# Patient Record
Sex: FEMALE | Race: WHITE | NOT HISPANIC OR LATINO | Employment: FULL TIME | URBAN - NONMETROPOLITAN AREA
[De-identification: names, ages, dates, MRNs, and addresses within clinical notes are randomized per-mention and may not be internally consistent; named-entity substitution may affect disease eponyms.]

---

## 2017-02-02 ENCOUNTER — CLINICAL SUPPORT (OUTPATIENT)
Dept: FAMILY MEDICINE CLINIC | Facility: CLINIC | Age: 32
End: 2017-02-02

## 2017-02-02 DIAGNOSIS — Z30.42 ENCOUNTER FOR SURVEILLANCE OF INJECTABLE CONTRACEPTIVE: Primary | ICD-10-CM

## 2017-02-02 PROCEDURE — 96372 THER/PROPH/DIAG INJ SC/IM: CPT | Performed by: NURSE PRACTITIONER

## 2017-02-02 RX ORDER — MEDROXYPROGESTERONE ACETATE 150 MG/ML
150 INJECTION, SUSPENSION INTRAMUSCULAR ONCE
Status: COMPLETED | OUTPATIENT
Start: 2017-02-02 | End: 2017-02-02

## 2017-02-02 RX ADMIN — MEDROXYPROGESTERONE ACETATE 150 MG: 150 INJECTION, SUSPENSION INTRAMUSCULAR at 15:55

## 2017-05-09 ENCOUNTER — OFFICE VISIT (OUTPATIENT)
Dept: FAMILY MEDICINE CLINIC | Facility: CLINIC | Age: 32
End: 2017-05-09

## 2017-05-09 VITALS
RESPIRATION RATE: 17 BRPM | HEART RATE: 73 BPM | OXYGEN SATURATION: 98 % | SYSTOLIC BLOOD PRESSURE: 122 MMHG | TEMPERATURE: 98.1 F | DIASTOLIC BLOOD PRESSURE: 80 MMHG | BODY MASS INDEX: 28.95 KG/M2 | HEIGHT: 68 IN | WEIGHT: 191 LBS

## 2017-05-09 DIAGNOSIS — Z76.3: Primary | ICD-10-CM

## 2017-05-09 DIAGNOSIS — Z71.89: Primary | ICD-10-CM

## 2017-05-10 ENCOUNTER — OFFICE VISIT (OUTPATIENT)
Dept: FAMILY MEDICINE CLINIC | Facility: CLINIC | Age: 32
End: 2017-05-10

## 2017-05-10 VITALS
HEART RATE: 85 BPM | DIASTOLIC BLOOD PRESSURE: 78 MMHG | WEIGHT: 191 LBS | SYSTOLIC BLOOD PRESSURE: 116 MMHG | HEIGHT: 68 IN | RESPIRATION RATE: 16 BRPM | OXYGEN SATURATION: 99 % | TEMPERATURE: 98.7 F | BODY MASS INDEX: 28.95 KG/M2

## 2017-05-10 DIAGNOSIS — Z71.89: Primary | ICD-10-CM

## 2017-05-10 DIAGNOSIS — Z76.3: Primary | ICD-10-CM

## 2017-05-10 PROCEDURE — 99212 OFFICE O/P EST SF 10 MIN: CPT | Performed by: NURSE PRACTITIONER

## 2017-05-22 ENCOUNTER — CLINICAL SUPPORT (OUTPATIENT)
Dept: FAMILY MEDICINE CLINIC | Facility: CLINIC | Age: 32
End: 2017-05-22

## 2017-05-22 ENCOUNTER — LAB (OUTPATIENT)
Dept: LAB | Facility: OTHER | Age: 32
End: 2017-05-22

## 2017-05-22 DIAGNOSIS — Z30.42 ENCOUNTER FOR SURVEILLANCE OF INJECTABLE CONTRACEPTIVE: ICD-10-CM

## 2017-05-22 DIAGNOSIS — Z30.42 ENCOUNTER FOR SURVEILLANCE OF INJECTABLE CONTRACEPTIVE: Primary | ICD-10-CM

## 2017-05-22 DIAGNOSIS — Z30.42 ENCOUNTER FOR DEPO-PROVERA CONTRACEPTION: Primary | ICD-10-CM

## 2017-05-22 LAB — B-HCG UR QL: NEGATIVE

## 2017-05-22 PROCEDURE — 81025 URINE PREGNANCY TEST: CPT | Performed by: NURSE PRACTITIONER

## 2017-05-22 PROCEDURE — 96372 THER/PROPH/DIAG INJ SC/IM: CPT | Performed by: NURSE PRACTITIONER

## 2017-05-22 RX ORDER — MEDROXYPROGESTERONE ACETATE 150 MG/ML
150 INJECTION, SUSPENSION INTRAMUSCULAR
Status: SHIPPED | OUTPATIENT
Start: 2017-05-22 | End: 2018-08-15

## 2017-05-22 RX ADMIN — MEDROXYPROGESTERONE ACETATE 150 MG: 150 INJECTION, SUSPENSION INTRAMUSCULAR at 16:01

## 2017-08-08 ENCOUNTER — CLINICAL SUPPORT (OUTPATIENT)
Dept: FAMILY MEDICINE CLINIC | Facility: CLINIC | Age: 32
End: 2017-08-08

## 2017-08-08 DIAGNOSIS — Z30.42 ENCOUNTER FOR DEPO-PROVERA CONTRACEPTION: Primary | ICD-10-CM

## 2017-08-08 PROCEDURE — 96372 THER/PROPH/DIAG INJ SC/IM: CPT | Performed by: NURSE PRACTITIONER

## 2017-08-08 RX ADMIN — MEDROXYPROGESTERONE ACETATE 150 MG: 150 INJECTION, SUSPENSION INTRAMUSCULAR at 11:00

## 2017-10-06 ENCOUNTER — OFFICE VISIT (OUTPATIENT)
Dept: FAMILY MEDICINE CLINIC | Facility: CLINIC | Age: 32
End: 2017-10-06

## 2017-10-06 VITALS
TEMPERATURE: 98.2 F | OXYGEN SATURATION: 99 % | HEART RATE: 79 BPM | HEIGHT: 68 IN | SYSTOLIC BLOOD PRESSURE: 124 MMHG | DIASTOLIC BLOOD PRESSURE: 68 MMHG | WEIGHT: 193 LBS | BODY MASS INDEX: 29.25 KG/M2

## 2017-10-06 DIAGNOSIS — Z00.00 ENCOUNTER FOR GENERAL ADULT MEDICAL EXAMINATION WITHOUT ABNORMAL FINDINGS: Primary | ICD-10-CM

## 2017-10-06 PROCEDURE — 99395 PREV VISIT EST AGE 18-39: CPT | Performed by: NURSE PRACTITIONER

## 2017-10-06 NOTE — PROGRESS NOTES
Arian Sandhu is a 32 y.o. female. Presents today for a wellness physical for her insurance.    History of Present Illness     The following portions of the patient's history were reviewed and updated as appropriate: allergies, current medications, past family history, past medical history, past social history, past surgical history and problem list.    Review of Systems   Constitutional: Negative for chills, fatigue and fever.   HENT: Negative for congestion, sneezing, sore throat and trouble swallowing.    Eyes: Negative for visual disturbance.   Respiratory: Negative for cough, chest tightness, shortness of breath and wheezing.    Cardiovascular: Negative for chest pain, palpitations and leg swelling.   Gastrointestinal: Negative for abdominal pain, constipation, diarrhea, nausea and vomiting.   Genitourinary: Negative for dysuria, frequency and urgency.   Musculoskeletal: Negative for neck pain.   Skin: Negative for rash.   Neurological: Negative for dizziness, weakness and headaches.   Psychiatric/Behavioral:        In the past two weeks the pt has not felt down, depressed, hopeless or lost interest in doing things   All other systems reviewed and are negative.      Objective   Physical Exam   Constitutional: She is oriented to person, place, and time. She appears well-developed and well-nourished. She is cooperative. She does not have a sickly appearance. She does not appear ill. No distress.   HENT:   Head: Normocephalic and atraumatic.   Right Ear: External ear normal.   Left Ear: External ear normal.   Nose: Nose normal.   Mouth/Throat: Uvula is midline, oropharynx is clear and moist and mucous membranes are normal.   Eyes: Conjunctivae, EOM and lids are normal. Pupils are equal, round, and reactive to light. Right eye exhibits no discharge. Left eye exhibits no discharge. No scleral icterus.   Neck: Trachea normal, normal range of motion and phonation normal. Neck supple. No thyromegaly  present.   Cardiovascular: Normal rate, regular rhythm, normal heart sounds and intact distal pulses.  Exam reveals no gallop and no friction rub.    No murmur heard.  Pulmonary/Chest: Effort normal and breath sounds normal. No respiratory distress. She has no wheezes. She has no rales.   Abdominal: Soft. Normal appearance and bowel sounds are normal. She exhibits no distension. There is no tenderness. There is no rebound and no guarding.   Musculoskeletal: Normal range of motion. She exhibits no edema.   Lymphadenopathy:     She has no cervical adenopathy.   Neurological: She is alert and oriented to person, place, and time. No cranial nerve deficit. GCS eye subscore is 4. GCS verbal subscore is 5. GCS motor subscore is 6.   Skin: Skin is warm, dry and intact. No rash noted.   Psychiatric: She has a normal mood and affect. Her behavior is normal. Judgment and thought content normal.   Nursing note and vitals reviewed.      Assessment/Plan   Alis was seen today for annual exam.    Diagnoses and all orders for this visit:    Encounter for general adult medical examination without abnormal findings  -     Lipid Panel; Future  -     Basic Metabolic Panel

## 2017-10-12 ENCOUNTER — LAB (OUTPATIENT)
Dept: LAB | Facility: OTHER | Age: 32
End: 2017-10-12

## 2017-10-12 DIAGNOSIS — Z00.00 ENCOUNTER FOR GENERAL ADULT MEDICAL EXAMINATION WITHOUT ABNORMAL FINDINGS: ICD-10-CM

## 2017-10-12 LAB
ANION GAP SERPL CALCULATED.3IONS-SCNC: 8 MMOL/L (ref 5–15)
BUN BLD-MCNC: 12 MG/DL (ref 8–25)
BUN/CREAT SERPL: 17.1 (ref 7–25)
CALCIUM SPEC-SCNC: 9.5 MG/DL (ref 8.4–10.8)
CHLORIDE SERPL-SCNC: 106 MMOL/L (ref 100–112)
CHOLEST SERPL-MCNC: 117 MG/DL (ref 150–200)
CO2 SERPL-SCNC: 27 MMOL/L (ref 20–32)
CREAT BLD-MCNC: 0.7 MG/DL (ref 0.4–1.3)
GFR SERPL CREATININE-BSD FRML MDRD: 98 ML/MIN/1.73 (ref 64–149)
GLUCOSE BLD-MCNC: 89 MG/DL (ref 70–100)
HDLC SERPL-MCNC: 49 MG/DL (ref 35–100)
LDLC SERPL CALC-MCNC: 58 MG/DL
LDLC/HDLC SERPL: 1.19 {RATIO}
POTASSIUM BLD-SCNC: 4 MMOL/L (ref 3.4–5.4)
SODIUM BLD-SCNC: 141 MMOL/L (ref 134–146)
TRIGL SERPL-MCNC: 48 MG/DL (ref 35–160)
VLDLC SERPL-MCNC: 9.6 MG/DL

## 2017-10-12 PROCEDURE — 36415 COLL VENOUS BLD VENIPUNCTURE: CPT | Performed by: NURSE PRACTITIONER

## 2017-10-12 PROCEDURE — 80061 LIPID PANEL: CPT | Performed by: NURSE PRACTITIONER

## 2017-10-12 PROCEDURE — 80048 BASIC METABOLIC PNL TOTAL CA: CPT | Performed by: NURSE PRACTITIONER

## 2017-10-13 ENCOUNTER — OFFICE VISIT (OUTPATIENT)
Dept: FAMILY MEDICINE CLINIC | Facility: CLINIC | Age: 32
End: 2017-10-13

## 2017-10-13 VITALS
RESPIRATION RATE: 16 BRPM | HEIGHT: 68 IN | HEART RATE: 81 BPM | DIASTOLIC BLOOD PRESSURE: 70 MMHG | SYSTOLIC BLOOD PRESSURE: 114 MMHG | TEMPERATURE: 99.9 F | BODY MASS INDEX: 29.55 KG/M2 | WEIGHT: 195 LBS | OXYGEN SATURATION: 99 %

## 2017-10-13 DIAGNOSIS — J40 BRONCHITIS: Primary | ICD-10-CM

## 2017-10-13 DIAGNOSIS — R05.9 COUGH: ICD-10-CM

## 2017-10-13 DIAGNOSIS — J02.9 ACUTE PHARYNGITIS, UNSPECIFIED ETIOLOGY: ICD-10-CM

## 2017-10-13 DIAGNOSIS — B37.9 ANTIBIOTIC-INDUCED YEAST INFECTION: ICD-10-CM

## 2017-10-13 DIAGNOSIS — T36.95XA ANTIBIOTIC-INDUCED YEAST INFECTION: ICD-10-CM

## 2017-10-13 DIAGNOSIS — H66.002 ACUTE SUPPURATIVE OTITIS MEDIA OF LEFT EAR WITHOUT SPONTANEOUS RUPTURE OF TYMPANIC MEMBRANE, RECURRENCE NOT SPECIFIED: ICD-10-CM

## 2017-10-13 PROCEDURE — 96372 THER/PROPH/DIAG INJ SC/IM: CPT | Performed by: NURSE PRACTITIONER

## 2017-10-13 PROCEDURE — 99213 OFFICE O/P EST LOW 20 MIN: CPT | Performed by: NURSE PRACTITIONER

## 2017-10-13 RX ORDER — AMOXICILLIN AND CLAVULANATE POTASSIUM 875; 125 MG/1; MG/1
1 TABLET, FILM COATED ORAL 2 TIMES DAILY
Qty: 20 TABLET | Refills: 0 | Status: SHIPPED | OUTPATIENT
Start: 2017-10-13 | End: 2018-06-26

## 2017-10-13 RX ORDER — DEXTROMETHORPHAN HYDROBROMIDE AND PROMETHAZINE HYDROCHLORIDE 15; 6.25 MG/5ML; MG/5ML
5 SYRUP ORAL NIGHTLY PRN
Qty: 118 ML | Refills: 0 | Status: SHIPPED | OUTPATIENT
Start: 2017-10-13 | End: 2018-06-26

## 2017-10-13 RX ORDER — FLUCONAZOLE 150 MG/1
TABLET ORAL
Qty: 2 TABLET | Refills: 0 | Status: SHIPPED | OUTPATIENT
Start: 2017-10-13 | End: 2018-06-26

## 2017-10-13 RX ORDER — BETAMETHASONE SODIUM PHOSPHATE AND BETAMETHASONE ACETATE 3; 3 MG/ML; MG/ML
6 INJECTION, SUSPENSION INTRA-ARTICULAR; INTRALESIONAL; INTRAMUSCULAR; SOFT TISSUE EVERY 24 HOURS
Status: DISCONTINUED | OUTPATIENT
Start: 2017-10-13 | End: 2017-10-13

## 2017-10-13 RX ORDER — BETAMETHASONE SODIUM PHOSPHATE AND BETAMETHASONE ACETATE 3; 3 MG/ML; MG/ML
6 INJECTION, SUSPENSION INTRA-ARTICULAR; INTRALESIONAL; INTRAMUSCULAR; SOFT TISSUE ONCE
Status: COMPLETED | OUTPATIENT
Start: 2017-10-13 | End: 2017-10-13

## 2017-10-13 RX ORDER — GUAIFENESIN 600 MG/1
1200 TABLET, EXTENDED RELEASE ORAL 2 TIMES DAILY
Qty: 40 TABLET | Refills: 0 | Status: SHIPPED | OUTPATIENT
Start: 2017-10-13 | End: 2018-06-26

## 2017-10-13 RX ORDER — ALBUTEROL SULFATE 90 UG/1
AEROSOL, METERED RESPIRATORY (INHALATION)
Qty: 1 INHALER | Refills: 1 | Status: SHIPPED | OUTPATIENT
Start: 2017-10-13 | End: 2018-06-26

## 2017-10-13 RX ADMIN — BETAMETHASONE SODIUM PHOSPHATE AND BETAMETHASONE ACETATE 6 MG: 3; 3 INJECTION, SUSPENSION INTRA-ARTICULAR; INTRALESIONAL; INTRAMUSCULAR; SOFT TISSUE at 16:52

## 2017-10-13 NOTE — PROGRESS NOTES
Arian   Alis Sandhu is a 32 y.o. female who presents to the office complaining of cough.  Cough   This is a new (x 1 week) problem. The current episode started in the past 7 days. The problem has been gradually worsening. The problem occurs constantly (started last wednesday with sore throat and has gradually gotten worse). The cough is productive of purulent sputum. Associated symptoms include chills, ear congestion, a fever, a sore throat and shortness of breath. Pertinent negatives include no chest pain, ear pain, headaches, heartburn, hemoptysis, myalgias, nasal congestion, postnasal drip, rash, rhinorrhea, sweats, weight loss or wheezing. Associated symptoms comments: Some chest tightness, mainly chest congestion, cough worse at nighttime, pain sever in throat with cough, no allergic like symptoms, no GI symptoms. Patient saw Alanna Morton Friday and she was told to take otc mucinex for congested cough. The symptoms are aggravated by cold air. Treatments tried: only taking mucinex. The treatment provided mild relief.      Been exposed to strep.     The following portions of the patient's history were reviewed and updated as appropriate: allergies, current medications, past family history, past medical history, past social history, past surgical history and problem list.    Review of Systems   Constitutional: Positive for chills, fatigue and fever. Negative for appetite change, diaphoresis and weight loss.   HENT: Positive for sore throat and trouble swallowing. Negative for congestion, ear pain, postnasal drip, rhinorrhea, sinus pain, sinus pressure and sneezing.    Eyes: Negative.    Respiratory: Positive for cough, chest tightness and shortness of breath. Negative for hemoptysis and wheezing.    Cardiovascular: Negative for chest pain, palpitations and leg swelling.   Gastrointestinal: Negative for abdominal pain, diarrhea, heartburn, nausea and vomiting.   Genitourinary: Negative.    Musculoskeletal:  "Negative for myalgias.   Skin: Negative for rash.   Neurological: Negative for dizziness, weakness, numbness and headaches.       Past Medical History:   Diagnosis Date   • Abnormal weight gain    • Acute frontal sinusitis    • Acute otitis media    • Acute pharyngitis    • Acute sinusitis    • Acute vaginitis    • Allergic rhinitis    • Bronchitis    • Cobalamin deficiency    • Contraception    • Contraception management    • Diarrhea    • Disorder of female genital organ    • Encounter for general adult medical examination without abnormal findings    • Encounter for gynecological examination    • Encounter for gynecological examination with abnormal finding    • Encounter for initial prescription of injectable contraceptive    • Encounter for surveillance of injectable contraceptive    • Generalized abdominal pain    • Nausea    • Obesity    • Other Obesity due to excess calories    • Pelvic and perineal pain    • Perforation of tympanic membrane    • Right upper quadrant pain    • Upper respiratory infection    • Vaginal discharge    • Vulvovaginitis        Family History   Problem Relation Age of Onset   • Diabetes Other    • Hypertension Other           Objective   /70 (BP Location: Right arm, Patient Position: Sitting, Cuff Size: Adult)  Pulse 81  Temp 99.9 °F (37.7 °C) (Oral)   Resp 16  Ht 68\" (172.7 cm)  Wt 195 lb (88.5 kg)  SpO2 99%  Breastfeeding? No  BMI 29.65 kg/m2  Physical Exam   Constitutional: She appears well-developed and well-nourished. She is cooperative.   HENT:   Head: Normocephalic.   Right Ear: Hearing, external ear and ear canal normal. No drainage. Tympanic membrane is injected and bulging. Tympanic membrane is not erythematous and not retracted. No decreased hearing is noted.   Left Ear: Hearing, external ear and ear canal normal. No drainage. Tympanic membrane is erythematous and bulging. Tympanic membrane is not injected and not retracted. No decreased hearing is noted. "   Nose: No mucosal edema, rhinorrhea or sinus tenderness. Right sinus exhibits no maxillary sinus tenderness and no frontal sinus tenderness. Left sinus exhibits no maxillary sinus tenderness and no frontal sinus tenderness.   Mouth/Throat: Uvula is midline and mucous membranes are normal. Oropharyngeal exudate, posterior oropharyngeal edema and posterior oropharyngeal erythema present. Tonsils are 2+ on the right. Tonsils are 2+ on the left.   Eyes: Conjunctivae are normal. Pupils are equal, round, and reactive to light.   Cardiovascular: Normal rate, regular rhythm, normal heart sounds and intact distal pulses.  Exam reveals no gallop and no friction rub.    No murmur heard.  Pulmonary/Chest: Effort normal. No respiratory distress. She has decreased breath sounds. She has wheezes in the right upper field, the left upper field and the left lower field. She has no rales.   Abdominal: Soft. Normal appearance and bowel sounds are normal. She exhibits no shifting dullness, no distension, no pulsatile liver, no fluid wave, no abdominal bruit, no ascites, no pulsatile midline mass and no mass. There is no hepatosplenomegaly. There is no tenderness. There is no rebound, no guarding, no CVA tenderness, no tenderness at McBurney's point and negative Richardson's sign. No hernia.   Lymphadenopathy:        Head (right side): Tonsillar adenopathy present. No submental, no submandibular, no preauricular, no posterior auricular and no occipital adenopathy present.        Head (left side): Tonsillar, preauricular and posterior auricular adenopathy present. No submental, no submandibular and no occipital adenopathy present.     She has cervical adenopathy.   Neurological: She is alert.   Psychiatric: She has a normal mood and affect. Her behavior is normal.   Nursing note and vitals reviewed.       PHQ-2/PHQ-9 Depression Screening 10/13/2017   Little interest or pleasure in doing things 0   Feeling down, depressed, or hopeless 0    Total Score 0         Assessment/Plan   Alis was seen today for cough.    Diagnoses and all orders for this visit:    Bronchitis  -     Discontinue: betamethasone acetate-betamethasone sodium phosphate (CELESTONE SOLUSPAN) injection 6 mg; Inject 1 mL into the shoulder, thigh, or buttocks Daily.  -     promethazine-dextromethorphan (PROMETHAZINE-DM) 6.25-15 MG/5ML syrup; Take 5 mL by mouth At Night As Needed (cough and nausea). Do not drive with this medication  -     albuterol (VENTOLIN HFA) 108 (90 Base) MCG/ACT inhaler; Take 2 puffs every 4 hrs while you're awake until the cough starts to get better than wean off.  -     betamethasone acetate-betamethasone sodium phosphate (CELESTONE SOLUSPAN) injection 6 mg; Inject 1 mL into the shoulder, thigh, or buttocks 1 (One) Time.    Acute suppurative otitis media of left ear without spontaneous rupture of tympanic membrane, recurrence not specified  -     amoxicillin-clavulanate (AUGMENTIN) 875-125 MG per tablet; Take 1 tablet by mouth 2 (Two) Times a Day.  -     guaiFENesin (MUCINEX) 600 MG 12 hr tablet; Take 2 tablets by mouth 2 (Two) Times a Day.    Acute pharyngitis, unspecified etiology    Antibiotic-induced yeast infection    Cough    Other orders  -     fluconazole (DIFLUCAN) 150 MG tablet; Take one 150 mg tab today and one tab in one week      Patient has acute bronchitis along with left ear infection and cough that keeps her up at night.  Prescribed albuterol, Celestone shot for bronchitis.  Augmentin for left ear infection and possible bacterial pharyngitis.  Gave Mucinex for congestive cough at prescription level.  Gave Phenergan DM for cough.  Gave diclofenac to prevent increased infection related to antibiotic intake.  Patient educated that if her cough, fever, or condition gets worse she should follow up with urgent care or the emergency room.  Patient educated to call me if she does not feel better and Monday.  Patient educated to drink plenty of  fluids and rest.  Patient in agreement and understands plan of care.     An After Visit Summary was printed and given to the patient.      Current Outpatient Prescriptions:   •  albuterol (VENTOLIN HFA) 108 (90 Base) MCG/ACT inhaler, Take 2 puffs every 4 hrs while you're awake until the cough starts to get better than wean off., Disp: 1 inhaler, Rfl: 1  •  amoxicillin-clavulanate (AUGMENTIN) 875-125 MG per tablet, Take 1 tablet by mouth 2 (Two) Times a Day., Disp: 20 tablet, Rfl: 0  •  fluconazole (DIFLUCAN) 150 MG tablet, Take one 150 mg tab today and one tab in one week, Disp: 2 tablet, Rfl: 0  •  guaiFENesin (MUCINEX) 600 MG 12 hr tablet, Take 2 tablets by mouth 2 (Two) Times a Day., Disp: 40 tablet, Rfl: 0  •  promethazine-dextromethorphan (PROMETHAZINE-DM) 6.25-15 MG/5ML syrup, Take 5 mL by mouth At Night As Needed (cough and nausea). Do not drive with this medication, Disp: 118 mL, Rfl: 0    Current Facility-Administered Medications:   •  MedroxyPROGESTERone Acetate (DEPO-PROVERA) injection 150 mg, 150 mg, Intramuscular, Q3 Months, MOHSEN Castrejon, 150 mg at 08/08/17 1100      MOHSEN Hays        This document has been electronically signed by MOHSEN Hays on October 13, 2017 5:07 PM

## 2017-10-19 DIAGNOSIS — J40 BRONCHITIS: Primary | ICD-10-CM

## 2017-10-19 RX ORDER — METHYLPREDNISOLONE 4 MG/1
TABLET ORAL
Qty: 1 EACH | Refills: 0 | Status: SHIPPED | OUTPATIENT
Start: 2017-10-19 | End: 2018-06-26

## 2017-11-08 ENCOUNTER — CLINICAL SUPPORT (OUTPATIENT)
Dept: FAMILY MEDICINE CLINIC | Facility: CLINIC | Age: 32
End: 2017-11-08

## 2017-11-08 DIAGNOSIS — Z30.42 ENCOUNTER FOR DEPO-PROVERA CONTRACEPTION: Primary | ICD-10-CM

## 2017-11-08 PROCEDURE — 96372 THER/PROPH/DIAG INJ SC/IM: CPT | Performed by: NURSE PRACTITIONER

## 2017-11-28 RX ADMIN — MEDROXYPROGESTERONE ACETATE 150 MG: 150 INJECTION, SUSPENSION INTRAMUSCULAR at 11:49

## 2018-06-26 ENCOUNTER — OFFICE VISIT (OUTPATIENT)
Dept: FAMILY MEDICINE CLINIC | Facility: CLINIC | Age: 33
End: 2018-06-26

## 2018-06-26 VITALS
HEART RATE: 76 BPM | SYSTOLIC BLOOD PRESSURE: 110 MMHG | HEIGHT: 68 IN | BODY MASS INDEX: 29.55 KG/M2 | DIASTOLIC BLOOD PRESSURE: 72 MMHG | WEIGHT: 195 LBS

## 2018-06-26 DIAGNOSIS — B07.8 OTHER VIRAL WARTS: Primary | ICD-10-CM

## 2018-06-26 PROCEDURE — 17110 DESTRUCTION B9 LES UP TO 14: CPT | Performed by: NURSE PRACTITIONER

## 2018-06-26 NOTE — PROGRESS NOTES
Subjective   Alis Sandhu is a 32 y.o. female. Patient here today with complaints of Verrucous Vulgaris  Patient here today with complaints of 3 warts on right hand middle finger distally, present times months, has been frozen previously but did not completely destroy warts.    Vitals:    06/26/18 1334   BP: 110/72   Pulse: 76     Past Medical History:   Diagnosis Date   • Abnormal weight gain    • Acute frontal sinusitis    • Acute otitis media    • Acute pharyngitis    • Acute sinusitis    • Acute vaginitis    • Allergic rhinitis    • Bronchitis    • Cobalamin deficiency    • Contraception    • Contraception management    • Diarrhea    • Disorder of female genital organ    • Encounter for general adult medical examination without abnormal findings    • Encounter for gynecological examination    • Encounter for gynecological examination with abnormal finding    • Encounter for initial prescription of injectable contraceptive    • Encounter for surveillance of injectable contraceptive    • Generalized abdominal pain    • Nausea    • Obesity    • Other Obesity due to excess calories    • Pelvic and perineal pain    • Perforation of tympanic membrane    • Right upper quadrant pain    • Upper respiratory infection    • Vaginal discharge    • Vulvovaginitis      History of Present Illness     The following portions of the patient's history were reviewed and updated as appropriate: allergies, current medications, past family history, past medical history, past social history, past surgical history and problem list.    Review of Systems   Constitutional: Negative.    HENT: Negative.    Eyes: Negative.    Respiratory: Negative.    Cardiovascular: Negative.    Gastrointestinal: Negative.    Endocrine: Negative.    Genitourinary: Negative.    Musculoskeletal: Negative.    Skin: Positive for wound (3 warts, right hand, middle finger).   Allergic/Immunologic: Negative.    Neurological: Negative.    Hematological: Negative.     Psychiatric/Behavioral: Negative.        Objective   Physical Exam   Constitutional: She is oriented to person, place, and time. She appears well-developed and well-nourished. No distress.   HENT:   Head: Normocephalic and atraumatic.   Cardiovascular: Normal rate.    Pulmonary/Chest: Effort normal.   Neurological: She is alert and oriented to person, place, and time.   Skin: Skin is warm and dry. Lesion (3 warts, middle finger, dutta aspect, right hand, distal finger) noted. No rash noted. She is not diaphoretic. No erythema.   Psychiatric: She has a normal mood and affect. Her behavior is normal.   Nursing note and vitals reviewed.      Assessment/Plan   Alis was seen today for verrucous vulgaris.    Diagnoses and all orders for this visit:    Other viral warts  Comments:  x3, right hand, middle finger    All 3 warts are frozen patient tolerated well.  She is advised on how to care for lesions.  If they do not completely resolve then she will be advised to use aspirin and duct tape, using noncoated aspirin.  If her complaints should persist or worsen at that point she will return to clinic for follow-up.  She otherwise will see PCP as scheduled for chronic conditions.  She is aware and is in agreement to this plan.Patient's Body mass index is 29.65 kg/m². BMI is overweight.  All questions and concerns are addressed with understanding noted.

## 2018-09-05 ENCOUNTER — OFFICE VISIT (OUTPATIENT)
Dept: FAMILY MEDICINE CLINIC | Facility: CLINIC | Age: 33
End: 2018-09-05

## 2018-09-05 VITALS
HEART RATE: 74 BPM | DIASTOLIC BLOOD PRESSURE: 70 MMHG | WEIGHT: 205 LBS | OXYGEN SATURATION: 99 % | TEMPERATURE: 98 F | BODY MASS INDEX: 31.07 KG/M2 | HEIGHT: 68 IN | SYSTOLIC BLOOD PRESSURE: 118 MMHG

## 2018-09-05 DIAGNOSIS — F41.9 ANXIETY: Primary | ICD-10-CM

## 2018-09-05 PROCEDURE — 99213 OFFICE O/P EST LOW 20 MIN: CPT | Performed by: NURSE PRACTITIONER

## 2018-09-05 RX ORDER — ALPRAZOLAM 0.25 MG/1
0.25 TABLET ORAL 2 TIMES DAILY PRN
Qty: 45 TABLET | Refills: 0 | Status: SHIPPED | OUTPATIENT
Start: 2018-09-05

## 2018-09-19 ENCOUNTER — OFFICE VISIT (OUTPATIENT)
Dept: FAMILY MEDICINE CLINIC | Facility: CLINIC | Age: 33
End: 2018-09-19

## 2018-09-19 VITALS
DIASTOLIC BLOOD PRESSURE: 68 MMHG | OXYGEN SATURATION: 99 % | HEART RATE: 101 BPM | HEIGHT: 68 IN | SYSTOLIC BLOOD PRESSURE: 120 MMHG | TEMPERATURE: 97.4 F | WEIGHT: 204 LBS | BODY MASS INDEX: 30.92 KG/M2

## 2018-09-19 DIAGNOSIS — Z00.00 ENCOUNTER FOR GENERAL ADULT MEDICAL EXAMINATION WITHOUT ABNORMAL FINDINGS: Primary | ICD-10-CM

## 2018-09-19 DIAGNOSIS — Z02.9 ENCOUNTER FOR ADMINISTRATIVE EXAMINATIONS: ICD-10-CM

## 2018-09-19 PROBLEM — F41.9 ANXIETY: Status: ACTIVE | Noted: 2018-09-19

## 2018-09-19 PROCEDURE — 99395 PREV VISIT EST AGE 18-39: CPT | Performed by: NURSE PRACTITIONER

## 2018-09-20 NOTE — PROGRESS NOTES
Subjective   Alis Sandhu is a 33 y.o. female who presents to the office complaining of anxiety.  Is having issues with 's ex-wife who is apparently trying to get more visitation time with their son.  History of Present Illness     The following portions of the patient's history were reviewed and updated as appropriate: allergies, current medications, past family history, past medical history, past social history, past surgical history and problem list.    Review of Systems   Constitutional: Negative for chills, fatigue and fever.   HENT: Negative for congestion, sneezing, sore throat and trouble swallowing.    Eyes: Negative for visual disturbance.   Respiratory: Negative for cough, chest tightness, shortness of breath and wheezing.    Cardiovascular: Negative for chest pain, palpitations and leg swelling.   Gastrointestinal: Negative for abdominal pain, constipation, diarrhea, nausea and vomiting.   Genitourinary: Negative for dysuria, frequency and urgency.   Musculoskeletal: Negative for neck pain.   Skin: Negative for rash.   Neurological: Negative for dizziness, weakness and headaches.   Psychiatric/Behavioral: The patient is nervous/anxious.         In the past two weeks the pt has not felt down, depressed, hopeless or lost interest in doing things   All other systems reviewed and are negative.      Objective   Physical Exam   Constitutional: She is oriented to person, place, and time. She appears well-developed and well-nourished. She is cooperative.  Non-toxic appearance. She does not have a sickly appearance. She does not appear ill.   HENT:   Head: Normocephalic and atraumatic.   Right Ear: External ear normal.   Left Ear: External ear normal.   Nose: Nose normal.   Mouth/Throat: Oropharynx is clear and moist.   Eyes: Pupils are equal, round, and reactive to light. Conjunctivae and EOM are normal. Right eye exhibits no discharge. Left eye exhibits no discharge. No scleral icterus.   Neck:  Normal range of motion. Neck supple. No thyromegaly present.   Cardiovascular: Normal rate, regular rhythm, normal heart sounds and intact distal pulses.  Exam reveals no gallop and no friction rub.    No murmur heard.  Pulmonary/Chest: Effort normal and breath sounds normal. No respiratory distress. She has no wheezes. She has no rales.   Abdominal: Soft. Bowel sounds are normal. She exhibits no distension. There is no tenderness. There is no rebound and no guarding.   Musculoskeletal: Normal range of motion. She exhibits no edema.   Lymphadenopathy:     She has no cervical adenopathy.   Neurological: She is alert and oriented to person, place, and time. No cranial nerve deficit. GCS eye subscore is 4. GCS verbal subscore is 5. GCS motor subscore is 6.   Skin: Skin is warm and dry. No rash noted.   Psychiatric: She has a normal mood and affect. Her speech is normal and behavior is normal. Judgment and thought content normal. Her mood appears not anxious. Cognition and memory are normal. She does not exhibit a depressed mood. She expresses no homicidal and no suicidal ideation. She expresses no suicidal plans and no homicidal plans.   Dressed appropriately for weather and situation, makes eye contact and engages in conversation; no outward signs of anxiety or depression   Nursing note and vitals reviewed.      Assessment/Plan   Alis was seen today for anxiety.    Diagnoses and all orders for this visit:    Anxiety    Other orders  -     ALPRAZolam (XANAX) 0.25 MG tablet; Take 1 tablet by mouth 2 (Two) Times a Day As Needed for Anxiety.    Encouraged to limit herself with caffeine.  Healthy diet and plenty of rest.

## 2018-09-21 ENCOUNTER — LAB (OUTPATIENT)
Dept: LAB | Facility: OTHER | Age: 33
End: 2018-09-21

## 2018-09-21 DIAGNOSIS — Z00.00 ENCOUNTER FOR GENERAL HEALTH EXAMINATION: ICD-10-CM

## 2018-09-21 DIAGNOSIS — Z00.00 ENCOUNTER FOR GENERAL HEALTH EXAMINATION: Primary | ICD-10-CM

## 2018-09-21 LAB
ALBUMIN SERPL-MCNC: 4.2 G/DL (ref 3.5–5)
ALBUMIN/GLOB SERPL: 1.3 G/DL (ref 1.1–1.8)
ALP SERPL-CCNC: 47 U/L (ref 38–126)
ALT SERPL W P-5'-P-CCNC: 16 U/L
ANION GAP SERPL CALCULATED.3IONS-SCNC: 8 MMOL/L (ref 5–15)
AST SERPL-CCNC: 25 U/L (ref 14–36)
BASOPHILS # BLD AUTO: 0.01 10*3/MM3 (ref 0–0.2)
BASOPHILS NFR BLD AUTO: 0.3 % (ref 0–2)
BILIRUB SERPL-MCNC: 0.9 MG/DL (ref 0.2–1.3)
BUN BLD-MCNC: 14 MG/DL (ref 7–17)
BUN/CREAT SERPL: 19.2 (ref 7–25)
CALCIUM SPEC-SCNC: 9.7 MG/DL (ref 8.4–10.2)
CHLORIDE SERPL-SCNC: 108 MMOL/L (ref 98–107)
CHOLEST SERPL-MCNC: 117 MG/DL (ref 150–200)
CO2 SERPL-SCNC: 26 MMOL/L (ref 22–30)
CREAT BLD-MCNC: 0.73 MG/DL (ref 0.52–1.04)
DEPRECATED RDW RBC AUTO: 42.6 FL (ref 36.4–46.3)
EOSINOPHIL # BLD AUTO: 0.03 10*3/MM3 (ref 0–0.7)
EOSINOPHIL NFR BLD AUTO: 0.8 % (ref 0–7)
ERYTHROCYTE [DISTWIDTH] IN BLOOD BY AUTOMATED COUNT: 12.4 % (ref 11.5–14.5)
GFR SERPL CREATININE-BSD FRML MDRD: 93 ML/MIN/1.73 (ref 64–149)
GLOBULIN UR ELPH-MCNC: 3.2 GM/DL (ref 2.3–3.5)
GLUCOSE BLD-MCNC: 96 MG/DL (ref 74–99)
HCT VFR BLD AUTO: 41.4 % (ref 35–45)
HDLC SERPL-MCNC: 46 MG/DL (ref 40–59)
HGB BLD-MCNC: 14 G/DL (ref 12–15.5)
LDLC SERPL CALC-MCNC: 63 MG/DL
LDLC/HDLC SERPL: 1.37 {RATIO} (ref 0–3.22)
LYMPHOCYTES # BLD AUTO: 1.08 10*3/MM3 (ref 0.6–4.2)
LYMPHOCYTES NFR BLD AUTO: 28.3 % (ref 10–50)
MCH RBC QN AUTO: 32.7 PG (ref 26.5–34)
MCHC RBC AUTO-ENTMCNC: 33.8 G/DL (ref 31.4–36)
MCV RBC AUTO: 96.7 FL (ref 80–98)
MONOCYTES # BLD AUTO: 0.28 10*3/MM3 (ref 0–0.9)
MONOCYTES NFR BLD AUTO: 7.3 % (ref 0–12)
NEUTROPHILS # BLD AUTO: 2.41 10*3/MM3 (ref 2–8.6)
NEUTROPHILS NFR BLD AUTO: 63.3 % (ref 37–80)
PLATELET # BLD AUTO: 235 10*3/MM3 (ref 150–450)
PMV BLD AUTO: 10 FL (ref 8–12)
POTASSIUM BLD-SCNC: 3.9 MMOL/L (ref 3.4–5)
PROT SERPL-MCNC: 7.4 G/DL (ref 6.3–8.2)
RBC # BLD AUTO: 4.28 10*6/MM3 (ref 3.77–5.16)
SODIUM BLD-SCNC: 142 MMOL/L (ref 137–145)
T4 FREE SERPL-MCNC: 0.97 NG/DL (ref 0.78–2.19)
TRIGL SERPL-MCNC: 41 MG/DL
TSH SERPL DL<=0.05 MIU/L-ACNC: 1.71 MIU/ML (ref 0.46–4.68)
VLDLC SERPL-MCNC: 8.2 MG/DL
WBC NRBC COR # BLD: 3.81 10*3/MM3 (ref 3.2–9.8)

## 2018-09-21 PROCEDURE — 85025 COMPLETE CBC W/AUTO DIFF WBC: CPT | Performed by: NURSE PRACTITIONER

## 2018-09-21 PROCEDURE — 80053 COMPREHEN METABOLIC PANEL: CPT | Performed by: NURSE PRACTITIONER

## 2018-09-21 PROCEDURE — 36415 COLL VENOUS BLD VENIPUNCTURE: CPT | Performed by: NURSE PRACTITIONER

## 2018-09-21 PROCEDURE — 84439 ASSAY OF FREE THYROXINE: CPT | Performed by: NURSE PRACTITIONER

## 2018-09-21 PROCEDURE — 84443 ASSAY THYROID STIM HORMONE: CPT | Performed by: NURSE PRACTITIONER

## 2018-09-21 PROCEDURE — 80061 LIPID PANEL: CPT | Performed by: NURSE PRACTITIONER

## 2018-09-24 ENCOUNTER — TELEPHONE (OUTPATIENT)
Dept: FAMILY MEDICINE CLINIC | Facility: CLINIC | Age: 33
End: 2018-09-24

## 2018-09-24 NOTE — TELEPHONE ENCOUNTER
----- Message from MOHSEN Salter sent at 9/24/2018  1:15 PM CDT -----  [facility] inform labs stable.

## 2018-09-27 ENCOUNTER — TELEPHONE (OUTPATIENT)
Dept: FAMILY MEDICINE CLINIC | Facility: CLINIC | Age: 33
End: 2018-09-27

## 2018-09-28 DIAGNOSIS — B07.8 OTHER VIRAL WARTS: Primary | ICD-10-CM

## 2018-10-03 PROBLEM — Z02.9 ENCOUNTER FOR ADMINISTRATIVE EXAMINATIONS: Status: ACTIVE | Noted: 2018-10-03

## 2018-10-04 NOTE — PROGRESS NOTES
Arian Sandhu is a 33 y.o. female.  Presents today for annual exam for her insurance, has the form that she needs completed.  Has no complaints today.      History of Present Illness     The following portions of the patient's history were reviewed and updated as appropriate: allergies, current medications, past family history, past medical history, past social history, past surgical history and problem list.    Review of Systems   Constitutional: Negative for chills, fatigue and fever.   HENT: Negative for congestion, sneezing, sore throat and trouble swallowing.    Eyes: Negative for visual disturbance.   Respiratory: Negative for cough, chest tightness, shortness of breath and wheezing.    Cardiovascular: Negative for chest pain, palpitations and leg swelling.   Gastrointestinal: Negative for abdominal pain, constipation, diarrhea, nausea and vomiting.   Genitourinary: Negative for dysuria, frequency and urgency.   Musculoskeletal: Negative for neck pain.   Skin: Negative for rash.   Neurological: Negative for dizziness, weakness and headaches.   Psychiatric/Behavioral:        In the past two weeks the pt has not felt down, depressed, hopeless or lost interest in doing things   All other systems reviewed and are negative.      Objective   Physical Exam   Constitutional: She is oriented to person, place, and time. She appears well-developed and well-nourished. She is cooperative.  Non-toxic appearance. She does not have a sickly appearance. She does not appear ill.   HENT:   Head: Normocephalic and atraumatic.   Right Ear: External ear normal.   Left Ear: External ear normal.   Nose: Nose normal.   Mouth/Throat: Oropharynx is clear and moist.   Eyes: Pupils are equal, round, and reactive to light. Conjunctivae and EOM are normal. Right eye exhibits no discharge. Left eye exhibits no discharge. No scleral icterus.   Neck: Normal range of motion. Neck supple. No thyromegaly present.   Cardiovascular:  Normal rate, regular rhythm, normal heart sounds and intact distal pulses.  Exam reveals no gallop and no friction rub.    No murmur heard.  Pulmonary/Chest: Effort normal and breath sounds normal. No respiratory distress. She has no wheezes. She has no rales.   Abdominal: Soft. Normal appearance and bowel sounds are normal. She exhibits no distension. There is no tenderness. There is no rebound and no guarding.   Musculoskeletal: Normal range of motion. She exhibits no edema.   Lymphadenopathy:     She has no cervical adenopathy.   Neurological: She is alert and oriented to person, place, and time. No cranial nerve deficit. GCS eye subscore is 4. GCS verbal subscore is 5. GCS motor subscore is 6.   Reflex Scores:       Patellar reflexes are 2+ on the right side and 2+ on the left side.  Skin: Skin is warm and dry. No rash noted.   Psychiatric: She has a normal mood and affect. Her behavior is normal. Judgment and thought content normal.   Nursing note and vitals reviewed.      Assessment/Plan   Alis was seen today for annual exam.    Diagnoses and all orders for this visit:    Encounter for general adult medical examination without abnormal findings    Encounter for administrative examinations    Encouraged to continue with current POC.  Healthy diet with cardio exercise three times weekly.